# Patient Record
Sex: FEMALE | Race: WHITE | NOT HISPANIC OR LATINO | Employment: FULL TIME | URBAN - METROPOLITAN AREA
[De-identification: names, ages, dates, MRNs, and addresses within clinical notes are randomized per-mention and may not be internally consistent; named-entity substitution may affect disease eponyms.]

---

## 2017-01-26 ENCOUNTER — AMBULATORY - HEALTHEAST (OUTPATIENT)
Dept: OBGYN | Facility: CLINIC | Age: 37
End: 2017-01-26

## 2017-01-26 DIAGNOSIS — R87.610 ATYPICAL SQUAMOUS CELLS OF UNDETERMINED SIGNIFICANCE ON CYTOLOGIC SMEAR OF CERVIX (ASC-US): ICD-10-CM

## 2017-01-26 ASSESSMENT — MIFFLIN-ST. JEOR: SCORE: 1355.13

## 2017-03-20 ENCOUNTER — COMMUNICATION - HEALTHEAST (OUTPATIENT)
Dept: OBGYN | Facility: CLINIC | Age: 37
End: 2017-03-20

## 2017-03-20 ENCOUNTER — COMMUNICATION - HEALTHEAST (OUTPATIENT)
Dept: ADMINISTRATIVE | Facility: CLINIC | Age: 37
End: 2017-03-20

## 2017-03-30 ENCOUNTER — OFFICE VISIT - HEALTHEAST (OUTPATIENT)
Dept: OBGYN | Facility: CLINIC | Age: 37
End: 2017-03-30

## 2017-03-30 DIAGNOSIS — Z31.9 INFERTILITY MANAGEMENT: ICD-10-CM

## 2017-03-30 ASSESSMENT — MIFFLIN-ST. JEOR: SCORE: 1350.6

## 2017-04-06 ENCOUNTER — COMMUNICATION - HEALTHEAST (OUTPATIENT)
Dept: OBGYN | Facility: CLINIC | Age: 37
End: 2017-04-06

## 2017-04-11 ENCOUNTER — AMBULATORY - HEALTHEAST (OUTPATIENT)
Dept: LAB | Facility: CLINIC | Age: 37
End: 2017-04-11

## 2017-04-11 DIAGNOSIS — Z31.9 INFERTILITY MANAGEMENT: ICD-10-CM

## 2017-04-14 ENCOUNTER — HOSPITAL ENCOUNTER (OUTPATIENT)
Dept: RADIOLOGY | Facility: HOSPITAL | Age: 37
Discharge: HOME | End: 2017-04-14
Attending: FAMILY MEDICINE

## 2017-04-14 DIAGNOSIS — Z31.9 INFERTILITY MANAGEMENT: ICD-10-CM

## 2017-04-20 ENCOUNTER — COMMUNICATION - HEALTHEAST (OUTPATIENT)
Dept: FAMILY MEDICINE | Facility: CLINIC | Age: 37
End: 2017-04-20

## 2017-04-20 ENCOUNTER — AMBULATORY - HEALTHEAST (OUTPATIENT)
Dept: FAMILY MEDICINE | Facility: CLINIC | Age: 37
End: 2017-04-20

## 2017-04-20 DIAGNOSIS — A60.04 TYPE 2 HSV INFECTION OF VULVOVAGINAL REGION: ICD-10-CM

## 2017-07-17 ENCOUNTER — PRENATAL OFFICE VISIT - HEALTHEAST (OUTPATIENT)
Dept: MIDWIFE SERVICES | Facility: CLINIC | Age: 37
End: 2017-07-17

## 2017-07-17 DIAGNOSIS — Z32.00 POSSIBLE PREGNANCY: ICD-10-CM

## 2017-07-17 DIAGNOSIS — A60.09 GENITAL HERPES AFFECTING PREGNANCY IN FIRST TRIMESTER: ICD-10-CM

## 2017-07-17 DIAGNOSIS — O09.521 SUPERVISION OF HIGH-RISK PREGNANCY OF ELDERLY MULTIGRAVIDA, FIRST TRIMESTER: ICD-10-CM

## 2017-07-17 DIAGNOSIS — O98.311 GENITAL HERPES AFFECTING PREGNANCY IN FIRST TRIMESTER: ICD-10-CM

## 2017-07-17 LAB — HIV 1+2 AB+HIV1 P24 AG SERPL QL IA: NEGATIVE

## 2017-07-17 RX ORDER — SWAB
1 SWAB, NON-MEDICATED MISCELLANEOUS DAILY
Status: SHIPPED | COMMUNITY
Start: 2017-07-17

## 2017-07-17 ASSESSMENT — MIFFLIN-ST. JEOR: SCORE: 1352.86

## 2017-07-18 ENCOUNTER — HOSPITAL ENCOUNTER (OUTPATIENT)
Dept: ULTRASOUND IMAGING | Facility: HOSPITAL | Age: 37
Discharge: HOME | End: 2017-07-18
Attending: ADVANCED PRACTICE MIDWIFE

## 2017-07-18 ENCOUNTER — AMBULATORY - HEALTHEAST (OUTPATIENT)
Dept: MIDWIFE SERVICES | Facility: CLINIC | Age: 37
End: 2017-07-18

## 2017-07-18 DIAGNOSIS — O09.521 SUPERVISION OF HIGH-RISK PREGNANCY OF ELDERLY MULTIGRAVIDA, FIRST TRIMESTER: ICD-10-CM

## 2017-07-18 LAB
HBV SURFACE AG SERPL QL IA: NEGATIVE
SYPHILIS RPR SCREEN - HISTORICAL: NORMAL

## 2017-07-19 ENCOUNTER — AMBULATORY - HEALTHEAST (OUTPATIENT)
Dept: OBGYN | Facility: CLINIC | Age: 37
End: 2017-07-19

## 2017-07-19 DIAGNOSIS — O99.891 ASYMPTOMATIC BACTERIURIA DURING PREGNANCY IN FIRST TRIMESTER: ICD-10-CM

## 2017-07-19 DIAGNOSIS — R82.71 ASYMPTOMATIC BACTERIURIA DURING PREGNANCY IN FIRST TRIMESTER: ICD-10-CM

## 2017-08-25 ENCOUNTER — PRENATAL OFFICE VISIT - HEALTHEAST (OUTPATIENT)
Dept: MIDWIFE SERVICES | Facility: CLINIC | Age: 37
End: 2017-08-25

## 2017-08-25 DIAGNOSIS — O09.522 SUPERVISION OF HIGH-RISK PREGNANCY OF ELDERLY MULTIGRAVIDA, SECOND TRIMESTER: ICD-10-CM

## 2017-08-25 ASSESSMENT — MIFFLIN-ST. JEOR: SCORE: 1375.54

## 2017-09-25 ENCOUNTER — HOSPITAL ENCOUNTER (OUTPATIENT)
Dept: ULTRASOUND IMAGING | Facility: HOSPITAL | Age: 37
Discharge: HOME | End: 2017-09-25
Attending: ADVANCED PRACTICE MIDWIFE

## 2017-09-25 DIAGNOSIS — O09.522 SUPERVISION OF HIGH-RISK PREGNANCY OF ELDERLY MULTIGRAVIDA, SECOND TRIMESTER: ICD-10-CM

## 2017-09-26 ENCOUNTER — AMBULATORY - HEALTHEAST (OUTPATIENT)
Dept: MIDWIFE SERVICES | Facility: CLINIC | Age: 37
End: 2017-09-26

## 2017-09-26 DIAGNOSIS — O99.891 ASYMPTOMATIC BACTERIURIA DURING PREGNANCY IN FIRST TRIMESTER: ICD-10-CM

## 2017-09-26 DIAGNOSIS — O98.311 GENITAL HERPES AFFECTING PREGNANCY IN FIRST TRIMESTER: ICD-10-CM

## 2017-09-26 DIAGNOSIS — O09.522 SUPERVISION OF HIGH-RISK PREGNANCY OF ELDERLY MULTIGRAVIDA, SECOND TRIMESTER: ICD-10-CM

## 2017-09-26 DIAGNOSIS — R82.71 ASYMPTOMATIC BACTERIURIA DURING PREGNANCY IN FIRST TRIMESTER: ICD-10-CM

## 2017-09-26 DIAGNOSIS — A60.09 GENITAL HERPES AFFECTING PREGNANCY IN FIRST TRIMESTER: ICD-10-CM

## 2017-10-05 ENCOUNTER — PRENATAL OFFICE VISIT - HEALTHEAST (OUTPATIENT)
Dept: MIDWIFE SERVICES | Facility: CLINIC | Age: 37
End: 2017-10-05

## 2017-10-05 DIAGNOSIS — O99.891 ASYMPTOMATIC BACTERIURIA DURING PREGNANCY IN FIRST TRIMESTER: ICD-10-CM

## 2017-10-05 DIAGNOSIS — R82.71 ASYMPTOMATIC BACTERIURIA DURING PREGNANCY IN FIRST TRIMESTER: ICD-10-CM

## 2017-10-05 DIAGNOSIS — O09.529 SUPERVISION OF HIGH-RISK PREGNANCY OF ELDERLY MULTIGRAVIDA: ICD-10-CM

## 2017-10-05 ASSESSMENT — MIFFLIN-ST. JEOR: SCORE: 1424.99

## 2017-10-07 ENCOUNTER — COMMUNICATION - HEALTHEAST (OUTPATIENT)
Dept: OBGYN | Facility: CLINIC | Age: 37
End: 2017-10-07

## 2017-10-07 DIAGNOSIS — R82.71 ASYMPTOMATIC BACTERIURIA DURING PREGNANCY IN FIRST TRIMESTER: ICD-10-CM

## 2017-10-07 DIAGNOSIS — O99.891 ASYMPTOMATIC BACTERIURIA DURING PREGNANCY IN FIRST TRIMESTER: ICD-10-CM

## 2017-10-08 ENCOUNTER — AMBULATORY - HEALTHEAST (OUTPATIENT)
Dept: OBGYN | Facility: CLINIC | Age: 37
End: 2017-10-08

## 2017-10-08 DIAGNOSIS — R82.71 ASYMPTOMATIC BACTERIURIA DURING PREGNANCY IN SECOND TRIMESTER: ICD-10-CM

## 2017-10-08 DIAGNOSIS — O99.891 ASYMPTOMATIC BACTERIURIA DURING PREGNANCY IN SECOND TRIMESTER: ICD-10-CM

## 2017-11-02 ENCOUNTER — PRENATAL OFFICE VISIT - HEALTHEAST (OUTPATIENT)
Dept: MIDWIFE SERVICES | Facility: CLINIC | Age: 37
End: 2017-11-02

## 2017-11-02 DIAGNOSIS — O99.891 ASYMPTOMATIC BACTERIURIA DURING PREGNANCY IN FIRST TRIMESTER: ICD-10-CM

## 2017-11-02 DIAGNOSIS — O09.529 SUPERVISION OF HIGH-RISK PREGNANCY OF ELDERLY MULTIGRAVIDA: ICD-10-CM

## 2017-11-02 DIAGNOSIS — R82.71 ASYMPTOMATIC BACTERIURIA DURING PREGNANCY IN FIRST TRIMESTER: ICD-10-CM

## 2017-11-02 ASSESSMENT — MIFFLIN-ST. JEOR: SCORE: 1468.98

## 2017-11-04 ENCOUNTER — AMBULATORY - HEALTHEAST (OUTPATIENT)
Dept: OBGYN | Facility: CLINIC | Age: 37
End: 2017-11-04

## 2017-11-06 ENCOUNTER — AMBULATORY - HEALTHEAST (OUTPATIENT)
Dept: MIDWIFE SERVICES | Facility: CLINIC | Age: 37
End: 2017-11-06

## 2017-11-06 ENCOUNTER — COMMUNICATION - HEALTHEAST (OUTPATIENT)
Dept: ADMINISTRATIVE | Facility: CLINIC | Age: 37
End: 2017-11-06

## 2017-11-06 DIAGNOSIS — A60.09 GENITAL HERPES AFFECTING PREGNANCY IN FIRST TRIMESTER: ICD-10-CM

## 2017-11-06 DIAGNOSIS — N39.0 UTI (URINARY TRACT INFECTION): ICD-10-CM

## 2017-11-06 DIAGNOSIS — O09.529 SUPERVISION OF HIGH-RISK PREGNANCY OF ELDERLY MULTIGRAVIDA: ICD-10-CM

## 2017-11-06 DIAGNOSIS — O99.891 ASYMPTOMATIC BACTERIURIA DURING PREGNANCY IN FIRST TRIMESTER: ICD-10-CM

## 2017-11-06 DIAGNOSIS — R82.71 ASYMPTOMATIC BACTERIURIA DURING PREGNANCY IN FIRST TRIMESTER: ICD-10-CM

## 2017-11-06 DIAGNOSIS — O98.311 GENITAL HERPES AFFECTING PREGNANCY IN FIRST TRIMESTER: ICD-10-CM

## 2017-11-16 ENCOUNTER — PRENATAL OFFICE VISIT - HEALTHEAST (OUTPATIENT)
Dept: MIDWIFE SERVICES | Facility: CLINIC | Age: 37
End: 2017-11-16

## 2017-11-16 DIAGNOSIS — O99.891 ASYMPTOMATIC BACTERIURIA DURING PREGNANCY IN FIRST TRIMESTER: ICD-10-CM

## 2017-11-16 DIAGNOSIS — R82.71 ASYMPTOMATIC BACTERIURIA DURING PREGNANCY IN FIRST TRIMESTER: ICD-10-CM

## 2017-11-16 DIAGNOSIS — O09.529 SUPERVISION OF HIGH-RISK PREGNANCY OF ELDERLY MULTIGRAVIDA: ICD-10-CM

## 2017-11-16 ASSESSMENT — MIFFLIN-ST. JEOR: SCORE: 1464.9

## 2017-11-17 LAB — SYPHILIS RPR SCREEN - HISTORICAL: NORMAL

## 2017-11-29 ENCOUNTER — PRENATAL OFFICE VISIT - HEALTHEAST (OUTPATIENT)
Dept: MIDWIFE SERVICES | Facility: CLINIC | Age: 37
End: 2017-11-29

## 2017-11-29 DIAGNOSIS — O99.891 ASYMPTOMATIC BACTERIURIA DURING PREGNANCY IN FIRST TRIMESTER: ICD-10-CM

## 2017-11-29 DIAGNOSIS — R82.71 ASYMPTOMATIC BACTERIURIA DURING PREGNANCY: ICD-10-CM

## 2017-11-29 DIAGNOSIS — O09.529 SUPERVISION OF HIGH-RISK PREGNANCY OF ELDERLY MULTIGRAVIDA: ICD-10-CM

## 2017-11-29 DIAGNOSIS — R82.71 ASYMPTOMATIC BACTERIURIA DURING PREGNANCY IN FIRST TRIMESTER: ICD-10-CM

## 2017-11-29 DIAGNOSIS — O99.891 ASYMPTOMATIC BACTERIURIA DURING PREGNANCY: ICD-10-CM

## 2017-11-29 ASSESSMENT — MIFFLIN-ST. JEOR: SCORE: 1473.07

## 2017-11-30 ENCOUNTER — AMBULATORY - HEALTHEAST (OUTPATIENT)
Dept: OBGYN | Facility: CLINIC | Age: 37
End: 2017-11-30

## 2017-11-30 DIAGNOSIS — O23.43 UTI IN PREGNANCY, ANTEPARTUM, THIRD TRIMESTER: ICD-10-CM

## 2017-12-01 ENCOUNTER — COMMUNICATION - HEALTHEAST (OUTPATIENT)
Dept: OBGYN | Facility: CLINIC | Age: 37
End: 2017-12-01

## 2017-12-01 DIAGNOSIS — R82.71 ASYMPTOMATIC BACTERIURIA DURING PREGNANCY IN THIRD TRIMESTER: ICD-10-CM

## 2017-12-01 DIAGNOSIS — O99.891 ASYMPTOMATIC BACTERIURIA DURING PREGNANCY IN THIRD TRIMESTER: ICD-10-CM

## 2017-12-14 ENCOUNTER — PRENATAL OFFICE VISIT - HEALTHEAST (OUTPATIENT)
Dept: MIDWIFE SERVICES | Facility: CLINIC | Age: 37
End: 2017-12-14

## 2017-12-14 DIAGNOSIS — O09.529 SUPERVISION OF HIGH-RISK PREGNANCY OF ELDERLY MULTIGRAVIDA: ICD-10-CM

## 2017-12-14 DIAGNOSIS — O23.43: ICD-10-CM

## 2017-12-14 ASSESSMENT — MIFFLIN-ST. JEOR: SCORE: 1486.67

## 2017-12-15 ENCOUNTER — COMMUNICATION - HEALTHEAST (OUTPATIENT)
Dept: MIDWIFE SERVICES | Facility: CLINIC | Age: 37
End: 2017-12-15

## 2017-12-28 ENCOUNTER — PRENATAL OFFICE VISIT - HEALTHEAST (OUTPATIENT)
Dept: MIDWIFE SERVICES | Facility: CLINIC | Age: 37
End: 2017-12-28

## 2017-12-28 ENCOUNTER — AMBULATORY - HEALTHEAST (OUTPATIENT)
Dept: MIDWIFE SERVICES | Facility: CLINIC | Age: 37
End: 2017-12-28

## 2017-12-28 DIAGNOSIS — O09.529 SUPERVISION OF HIGH-RISK PREGNANCY OF ELDERLY MULTIGRAVIDA: ICD-10-CM

## 2017-12-28 DIAGNOSIS — A60.04 HERPES SIMPLEX VULVOVAGINITIS: ICD-10-CM

## 2017-12-28 ASSESSMENT — MIFFLIN-ST. JEOR: SCORE: 1495.29

## 2018-01-10 ENCOUNTER — COMMUNICATION - HEALTHEAST (OUTPATIENT)
Dept: ADMINISTRATIVE | Facility: CLINIC | Age: 38
End: 2018-01-10

## 2018-01-10 ENCOUNTER — HOSPITAL ENCOUNTER (OUTPATIENT)
Dept: ULTRASOUND IMAGING | Facility: HOSPITAL | Age: 38
Discharge: HOME | End: 2018-01-10
Attending: ADVANCED PRACTICE MIDWIFE

## 2018-01-10 ENCOUNTER — PRENATAL OFFICE VISIT - HEALTHEAST (OUTPATIENT)
Dept: MIDWIFE SERVICES | Facility: CLINIC | Age: 38
End: 2018-01-10

## 2018-01-10 DIAGNOSIS — O09.529 SUPERVISION OF HIGH-RISK PREGNANCY OF ELDERLY MULTIGRAVIDA: ICD-10-CM

## 2018-01-10 LAB — HGB BLD-MCNC: 12.1 G/DL (ref 12–16)

## 2018-01-10 ASSESSMENT — MIFFLIN-ST. JEOR: SCORE: 1518.43

## 2018-01-11 ENCOUNTER — AMBULATORY - HEALTHEAST (OUTPATIENT)
Dept: MIDWIFE SERVICES | Facility: CLINIC | Age: 38
End: 2018-01-11

## 2018-01-11 LAB
ALLERGIC TO PENICILLIN: YES
BACTERIA SPEC CULT: NO GROWTH
GP B STREP DNA SPEC QL NAA+PROBE: NEGATIVE

## 2018-01-17 ENCOUNTER — PRENATAL OFFICE VISIT - HEALTHEAST (OUTPATIENT)
Dept: MIDWIFE SERVICES | Facility: CLINIC | Age: 38
End: 2018-01-17

## 2018-01-17 DIAGNOSIS — O99.891 ASYMPTOMATIC BACTERIURIA DURING PREGNANCY IN FIRST TRIMESTER: ICD-10-CM

## 2018-01-17 DIAGNOSIS — R82.71 ASYMPTOMATIC BACTERIURIA DURING PREGNANCY IN FIRST TRIMESTER: ICD-10-CM

## 2018-01-17 DIAGNOSIS — A60.09 GENITAL HERPES AFFECTING PREGNANCY IN FIRST TRIMESTER: ICD-10-CM

## 2018-01-17 DIAGNOSIS — O98.311 GENITAL HERPES AFFECTING PREGNANCY IN FIRST TRIMESTER: ICD-10-CM

## 2018-01-17 DIAGNOSIS — O26.00: ICD-10-CM

## 2018-01-17 ASSESSMENT — MIFFLIN-ST. JEOR: SCORE: 1550.18

## 2018-01-24 ENCOUNTER — PRENATAL OFFICE VISIT - HEALTHEAST (OUTPATIENT)
Dept: MIDWIFE SERVICES | Facility: CLINIC | Age: 38
End: 2018-01-24

## 2018-01-24 DIAGNOSIS — O26.03 EXCESSIVE WEIGHT GAIN DURING PREGNANCY IN THIRD TRIMESTER: ICD-10-CM

## 2018-01-24 DIAGNOSIS — O09.529 SUPERVISION OF HIGH-RISK PREGNANCY OF ELDERLY MULTIGRAVIDA: ICD-10-CM

## 2018-01-24 ASSESSMENT — MIFFLIN-ST. JEOR: SCORE: 1545.64

## 2018-02-02 ENCOUNTER — PRENATAL OFFICE VISIT - HEALTHEAST (OUTPATIENT)
Dept: MIDWIFE SERVICES | Facility: CLINIC | Age: 38
End: 2018-02-02

## 2018-02-02 DIAGNOSIS — O09.529 SUPERVISION OF HIGH-RISK PREGNANCY OF ELDERLY MULTIGRAVIDA: ICD-10-CM

## 2018-02-02 ASSESSMENT — MIFFLIN-ST. JEOR: SCORE: 1558.34

## 2018-02-07 ENCOUNTER — COMMUNICATION - HEALTHEAST (OUTPATIENT)
Dept: OBGYN | Facility: CLINIC | Age: 38
End: 2018-02-07

## 2018-02-09 ENCOUNTER — HOME CARE/HOSPICE - HEALTHEAST (OUTPATIENT)
Dept: HOME HEALTH SERVICES | Facility: HOME HEALTH | Age: 38
End: 2018-02-09

## 2018-02-10 ENCOUNTER — HOME CARE/HOSPICE - HEALTHEAST (OUTPATIENT)
Dept: HOME HEALTH SERVICES | Facility: HOME HEALTH | Age: 38
End: 2018-02-10

## 2018-03-06 ENCOUNTER — COMMUNICATION - HEALTHEAST (OUTPATIENT)
Dept: MIDWIFE SERVICES | Facility: CLINIC | Age: 38
End: 2018-03-06

## 2018-03-21 ENCOUNTER — AMBULATORY - HEALTHEAST (OUTPATIENT)
Dept: MIDWIFE SERVICES | Facility: CLINIC | Age: 38
End: 2018-03-21

## 2018-03-21 ENCOUNTER — OFFICE VISIT - HEALTHEAST (OUTPATIENT)
Dept: MIDWIFE SERVICES | Facility: CLINIC | Age: 38
End: 2018-03-21

## 2018-03-21 DIAGNOSIS — Z01.812 PRE-PROCEDURE LAB EXAM: ICD-10-CM

## 2018-03-21 DIAGNOSIS — Z12.4 PAP SMEAR FOR CERVICAL CANCER SCREENING: ICD-10-CM

## 2018-03-21 ASSESSMENT — MIFFLIN-ST. JEOR: SCORE: 1382.35

## 2018-03-22 LAB
C TRACH DNA SPEC QL PROBE+SIG AMP: NEGATIVE
HPV SOURCE: NORMAL
HUMAN PAPILLOMA VIRUS 16 DNA: NEGATIVE
HUMAN PAPILLOMA VIRUS 18 DNA: NEGATIVE
HUMAN PAPILLOMA VIRUS FINAL DIAGNOSIS: NORMAL
HUMAN PAPILLOMA VIRUS OTHER HR: NEGATIVE
N GONORRHOEA DNA SPEC QL NAA+PROBE: NEGATIVE
SPECIMEN DESCRIPTION: NORMAL

## 2018-04-02 ENCOUNTER — OFFICE VISIT - HEALTHEAST (OUTPATIENT)
Dept: MIDWIFE SERVICES | Facility: CLINIC | Age: 38
End: 2018-04-02

## 2018-04-02 ENCOUNTER — AMBULATORY - HEALTHEAST (OUTPATIENT)
Dept: LAB | Facility: CLINIC | Age: 38
End: 2018-04-02

## 2018-04-02 DIAGNOSIS — Z97.5 PRESENCE OF OF 52 MG LEVONORGESTREL-RELEASING INTRAUTERINE DEVICE (IUD): ICD-10-CM

## 2018-04-02 DIAGNOSIS — Z30.430 ENCOUNTER FOR INSERTION OF MIRENA IUD: ICD-10-CM

## 2018-04-02 DIAGNOSIS — Z01.812 PRE-PROCEDURE LAB EXAM: ICD-10-CM

## 2018-04-02 LAB
HCG UR QL: NEGATIVE
SP GR UR STRIP: 1 (ref 1–1.03)

## 2018-04-02 ASSESSMENT — MIFFLIN-ST. JEOR: SCORE: 1382.35

## 2018-04-03 ENCOUNTER — AMBULATORY - HEALTHEAST (OUTPATIENT)
Dept: MIDWIFE SERVICES | Facility: CLINIC | Age: 38
End: 2018-04-03

## 2018-05-25 ENCOUNTER — OFFICE VISIT - HEALTHEAST (OUTPATIENT)
Dept: MIDWIFE SERVICES | Facility: CLINIC | Age: 38
End: 2018-05-25

## 2018-05-25 DIAGNOSIS — Z97.5 PRESENCE OF OF 52 MG LEVONORGESTREL-RELEASING INTRAUTERINE DEVICE (IUD): ICD-10-CM

## 2018-05-25 ASSESSMENT — MIFFLIN-ST. JEOR: SCORE: 1382.35

## 2018-05-29 ENCOUNTER — COMMUNICATION - HEALTHEAST (OUTPATIENT)
Dept: OBGYN | Facility: CLINIC | Age: 38
End: 2018-05-29

## 2018-05-29 ENCOUNTER — COMMUNICATION - HEALTHEAST (OUTPATIENT)
Dept: MIDWIFE SERVICES | Facility: CLINIC | Age: 38
End: 2018-05-29

## 2018-11-14 ENCOUNTER — RECORDS - HEALTHEAST (OUTPATIENT)
Dept: ADMINISTRATIVE | Facility: OTHER | Age: 38
End: 2018-11-14

## 2018-11-14 LAB
LAB AP CHARGES (HE HISTORICAL CONVERSION): NORMAL
PATH REPORT.COMMENTS IMP SPEC: NORMAL
PATH REPORT.FINAL DX SPEC: NORMAL
PATH REPORT.GROSS SPEC: NORMAL
PATH REPORT.MICROSCOPIC SPEC OTHER STN: NORMAL
PATH REPORT.RELEVANT HX SPEC: NORMAL
RESULT FLAG (HE HISTORICAL CONVERSION): NORMAL

## 2020-05-28 ENCOUNTER — COMMUNICATION - HEALTHEAST (OUTPATIENT)
Dept: SCHEDULING | Facility: CLINIC | Age: 40
End: 2020-05-28

## 2020-05-28 ENCOUNTER — OFFICE VISIT - HEALTHEAST (OUTPATIENT)
Dept: FAMILY MEDICINE | Facility: CLINIC | Age: 40
End: 2020-05-28

## 2020-05-28 DIAGNOSIS — J03.90 TONSILLITIS: ICD-10-CM

## 2020-05-28 ASSESSMENT — PATIENT HEALTH QUESTIONNAIRE - PHQ9: SUM OF ALL RESPONSES TO PHQ QUESTIONS 1-9: 0

## 2021-04-05 ENCOUNTER — RECORDS - HEALTHEAST (OUTPATIENT)
Dept: LAB | Facility: HOSPITAL | Age: 41
End: 2021-04-05

## 2021-04-07 LAB — VZV IGG SER QL IA: POSITIVE

## 2021-05-27 ASSESSMENT — PATIENT HEALTH QUESTIONNAIRE - PHQ9: SUM OF ALL RESPONSES TO PHQ QUESTIONS 1-9: 0

## 2021-05-29 ENCOUNTER — RECORDS - HEALTHEAST (OUTPATIENT)
Dept: ADMINISTRATIVE | Facility: CLINIC | Age: 41
End: 2021-05-29

## 2021-05-30 VITALS — BODY MASS INDEX: 21.37 KG/M2 | WEIGHT: 141 LBS | HEIGHT: 68 IN

## 2021-05-30 VITALS — HEIGHT: 68 IN | WEIGHT: 140 LBS | BODY MASS INDEX: 21.22 KG/M2

## 2021-05-31 VITALS — WEIGHT: 165.2 LBS | BODY MASS INDEX: 25.04 KG/M2 | HEIGHT: 68 IN

## 2021-05-31 VITALS — WEIGHT: 140.5 LBS | HEIGHT: 68 IN | BODY MASS INDEX: 21.29 KG/M2

## 2021-05-31 VITALS — HEIGHT: 68 IN | WEIGHT: 171.9 LBS | BODY MASS INDEX: 26.05 KG/M2

## 2021-05-31 VITALS — BODY MASS INDEX: 26.83 KG/M2 | HEIGHT: 68 IN | WEIGHT: 177 LBS

## 2021-05-31 VITALS — BODY MASS INDEX: 23.7 KG/M2 | HEIGHT: 68 IN | WEIGHT: 156.4 LBS

## 2021-05-31 VITALS — HEIGHT: 68 IN | WEIGHT: 167 LBS | BODY MASS INDEX: 25.31 KG/M2

## 2021-05-31 VITALS — BODY MASS INDEX: 25.76 KG/M2 | WEIGHT: 170 LBS | HEIGHT: 68 IN

## 2021-05-31 VITALS — BODY MASS INDEX: 25.17 KG/M2 | HEIGHT: 68 IN | WEIGHT: 166.1 LBS

## 2021-05-31 VITALS — WEIGHT: 183 LBS | BODY MASS INDEX: 27.74 KG/M2 | HEIGHT: 68 IN

## 2021-05-31 VITALS — WEIGHT: 145.5 LBS | HEIGHT: 68 IN | BODY MASS INDEX: 22.05 KG/M2

## 2021-05-31 VITALS — WEIGHT: 184 LBS | HEIGHT: 68 IN | BODY MASS INDEX: 27.89 KG/M2

## 2021-06-01 VITALS — WEIGHT: 185.8 LBS | BODY MASS INDEX: 28.16 KG/M2 | HEIGHT: 68 IN

## 2021-06-01 VITALS — WEIGHT: 147 LBS | HEIGHT: 68 IN | BODY MASS INDEX: 22.28 KG/M2

## 2021-06-01 VITALS — BODY MASS INDEX: 22.28 KG/M2 | WEIGHT: 147 LBS | HEIGHT: 68 IN

## 2021-06-08 NOTE — PROGRESS NOTES
"Cathleen Sterling is a 39 y.o. female who is being evaluated via a billable video visit.      The patient has been notified of following:     \"This video visit will be conducted via a call between you and your physician/provider. We have found that certain health care needs can be provided without the need for an in-person physical exam.  This service lets us provide the care you need with a video conversation.  If a prescription is necessary we can send it directly to your pharmacy.  If lab work is needed we can place an order for that and you can then stop by our lab to have the test done at a later time.    Video visits are billed at different rates depending on your insurance coverage. Please reach out to your insurance provider with any questions.    If during the course of the call the physician/provider feels a video visit is not appropriate, you will not be charged for this service.\"    Patient has given verbal consent to a Video visit? Yes    Patient would like to receive their AVS by AVS Preference: Jean.    Patient would like the video invitation sent by: Send to e-mail at: [email or web address]    Will anyone else be joining your video visit? No        Video Start Time: 9:35AM    Additional provider notes: Patient is a 39-year-old mother of 3 at home for 2 or 3 days with an increasing sore throat past medical history of streptococcal pharyngitis.  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  Patient will be off of work 1 day from now for 7 days.  Clinically she is a little achy denies a fever no cough constitutionally she feels that she may be coming down with strep.  We did conduct a video visit appears not to have any swollen lymph nodes or clinical cough but redness in the posterior pharynx.  I have made a medical decision to treat her.  She has a documented penicillin allergy so we will treat her with a 10-day course of erythromycin.  Pros " and cons of therapy discussed with patient she will contact us if she feels she is clinically declining.  If she needs a work slip and will also fill that out.  All medical questions asked were answered.  Pleasant patient      Video-Visit Details    Type of service:  Video Visit    Video End Time (time video stopped): 9:48  Originating Location (pt. Location): home    Distant Location (provider location):  Home office    Platform used for Video Visi doximity

## 2021-06-08 NOTE — TELEPHONE ENCOUNTER
"\"I have a really bad sore throat.\" Symptoms starting 2 days ago. Rating pain as \"moderate pain.\"  Patient is alternating tylenol and Ibuprofen. Reporting possible white patches on throat. Swollen glands. Afebrile. Taking fluids.     Warm transferred to Central Scheduling.     Luz Nguyen RN  [facility]    COVID 19 Nurse Triage Plan/Patient Instructions    Please be aware that novel coronavirus (COVID-19) may be circulating in the community. If you develop symptoms such as fever, cough, or SOB or if you have concerns about the presence of another infection including coronavirus (COVID-19), please contact your health care provider or visit [email or web address].     Disposition/Instructions    Patient to have scheduled Telephone Visit with a provider. Follow System Ambulatory Workflow for COVID 19.     The clinic staff will assist you to schedule an appointment to complete the Telephone Visit with a provider during normal clinic hours.       Call Back If: Your symptoms worsen before you are able to complete your Telephone Visit with a provider.        Thank you for limiting contact with others, wearing a simple mask to cover your cough, practice good hand hygiene habits and accessing our virtual services where possible to limit the spread of this virus.    For more information about COVID19 and options for caring for yourself at home, please visit the CDC website at https://www.cdc.gov/coronavirus/2019-ncov/about/steps-when-sick.html  For more options for care at [facility], please visit our website at [email or web address]    For more information, please use the [place] Department of Health COVID-19 Website: https://www.health.state.[place]/diseases/coronavirus/index.html  [place] Department of Health ([facility]) COVID-19 Hotlines (Interpreters available):      Health questions: Phone Number: [phone] or [phone] and Hours: 7 a.m. to 7 p.m.    Schools and child " care questions: Phone Number: [phone] or [phone] and Hours 7 a.m. to 7 p.m.                      Reason for Disposition    Pus on tonsils (back of throat) and swollen neck lymph nodes ('glands')    Protocols used: SORE THROAT-A-OH

## 2021-06-08 NOTE — PROGRESS NOTES
Colposcopy Procedure Note    Cathleen Sterling is a 36 y.o. female is here today for a Colposcopy. H/o of abnormal pap smears and now ASCUS HR HPV+    Indications:   Pap Smear history: 2012-LSIL, colp KYMBERLY I  2013, normal pap , but +type 31 HPV  2014, ASCUS, colp KYMBERLY I  8/14, and 11/15 normal pap, +HR HPV  12/16:Pap ASCUS HR HPV+,     Procedure Details   The reason for procedure is explained and informed consent obtained.    Speculum placed in vagina and visualization of cervix achieved, cervix swabbed with 3% acetic acid solution. Procedure details: No biopsies taken    Findings:  Cervix: no visible lesions and SCJ visualized 360 degrees without lesions;       Specimens: none    Complications: none.    Plan:   Roll of HPV in causing cervical pap smear abnomalities, dysplasia, and cancer is reviewed with the patient. She will be contacted in one year for repeat pap smear and HPV testing.    Irene Portillo

## 2021-06-09 NOTE — PROGRESS NOTES
"Assessment / Impression     1. Infertility management  Progesterone    Thyroid Stimulating Hormone (TSH)    Prolactin    XR Hysterosalpingogram       Plan:     1. Discussed workup of infertility. HSG ordered. Progesterone 1 week after ovulation ordered along with TSH and Prolactin. Significant other will get CPE and semen analysis. Then on day 3 she will have FSH and Estradiol ordered. Continue PNV. All questions answered.    Subjective:      HPI: Cathleen Sterling is a 36 y.o. female with  Infertility questions presents today. See 10/16 note for details. GYN update she had Pos home pregnancy tests in Nov (multiple), but when came in for confirmation the tests were negative. She had likely early miscarriage. She had normal Dec, Jan, and Feb cycles all 24-25 days with documented ovulation with home kits. No other change in personal history. Interested in talking about next steps to conceive.       Review of Systems  Pertinent items are noted in HPI.       Objective:     /63 (Patient Site: Left Arm, Patient Position: Sitting, Cuff Size: Adult Regular)  Pulse 64  Ht 5' 7.5\" (1.715 m)  Wt 140 lb (63.5 kg)  LMP 03/13/2017  SpO2 100%  BMI 21.6 kg/m2  The rest of the entire 20 minute visit greater than 50% of our visit was spent face to face counseling infertility workup and management  "

## 2021-06-11 NOTE — PROGRESS NOTES
PRENATAL VISIT   FIRST OBSTETRICAL EXAM - OB    Assessment / Impression     First prenatal visit at 10.4 weeks    AMA >36 yo  Hx HSV    Plan:     -IOB labs drawn.  -Pt is interested in drawing lead level.  -Patient is likely not interested in waterbirth.  Hep C not drawn today.  -Reviewed prenatal care schedule.  -Optimal nutrition and weight gain discussed. Pregnancy weight gain of 25-35 lbs (BMI 18.5-24.9) encouraged.   -Anticipatory guidance for common pregnancy questions and concerns reviewed.   -Danger s/sx for this trimester reviewed with patient.  -Reviewed genetic carrier screening specific to patients ethnic heritage.  Patient declines the screening  -Reviewed genetic aneuploidy screening options. Patient will discuss with her  and consider aneuploidy screening. Information given on Verifi with [facility], and first trimester screening - knows to call back if desires either and timeframe of first trimester screening.  -The following referrals were given to patient for follow up: OB U/S for viability/dating.  -IOB packet given and reviewed with patient.  -CNM services and hospital options reviewed; emergency and scheduling phone numbers given to patient.  -Return to clinic 4 weeks    Total time spent with patient: 45 minutes, >50% time spent counseling and coordinating care.    Subjective:    Cathleen Sterling is a 37 y.o.  here today for her First Obstetrical Exam.  She had been trying to get pregnant for about 1.5 years - did not have formal infertility work-up.    This is her and Dago's first baby together.  Dago has a 6 year old child from a previous relationship.  Her last pap smear was 2016 and ASCUS with +HPV, a colposcopy was done and recommendation to repeat pap in one year - will do postpartu.    Her older child, Pepe, was a fairly quick labor as she arrived to the hospital at 8cm, received Nubain, and recalls not pushing for very long.  She had no tears or stitches.       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  Partners name: Dago      OB History    Para Term  AB Living   4 1 1  2 1   SAB TAB Ectopic Multiple Live Births   1 1   1      # Outcome Date GA Lbr Huseyin/2nd Weight Sex Delivery Anes PTL Lv   4 Current            3 SAB 2016 5w0d             Birth Comments: No D&C   2 TAB 2008              Birth Comments: D&C   1 Term 05 38w0d  7 lb 11 oz (3.487 kg) M Vag-Spont IV N KAELYN      Birth Comments: 8 cm on arrival (back labor), GBS positive, no tear - no stitches, BF x1 year          Expected Date of Delivery: 2018    Past Medical History:   Diagnosis Date     Abnormal Pap smear of cervix     see labs (ASCUS with HPV+,LSIL)     Allergic     seasonal, used to get allergy shots     Herpes 2013    rare outbreak     Varicella     as a child     Past Surgical History:   Procedure Laterality Date     COLPOSCOPY  2017     WISDOM TOOTH EXTRACTION       Social History   Substance Use Topics     Smoking status: Never Smoker     Smokeless tobacco: Never Used     Alcohol use No      Comment: Social     Current Outpatient Prescriptions   Medication Sig Dispense Refill     betamethasone dipropionate (DIPROLENE) 0.05 % ointment Apply 1-2 times daily x 2-4 weeks. After achieving remission then apply 2-3 times per week to prevent relapse for 2-3 months. 30 g 0     prenatal vitamin iron-folic acid 27mg-0.8mg (PRENATAL S) 27 mg iron- 800 mcg Tab tablet Take 1 tablet by mouth daily.       No current facility-administered medications for this visit.      Allergies   Allergen Reactions     Penicillins Unknown     When patient was younger.               Pregnancy Risk Factors: Age is <18 or >35    EPDS score today: 3    Review of Systems  General:  Denies problem  Eyes: Denies problem  Ears/Nose/Throat: Denies problem  Cardiovascular: Denies problem  Respiratory:  Denies problem  Gastrointestinal:  Denies problem  Genitourinary: Denies  "problem  Musculoskeletal:  Denies problem  Skin: Denies problem  Neurologic: Denies problem  Psychiatric: Denies problem  Endocrine: Denies problem  Heme/Lymphatic: Denies problem   Allergic/Immunologic: Denies problem       Objective:   Objective    Vitals:    07/17/17 0827   BP: 102/62   Pulse: 76   Resp: 18   Weight: 140 lb 8 oz (63.7 kg)   Height: 5' 7.5\" (1.715 m)     Physical Exam:  General Appearance: Alert, cooperative, no distress, appears stated age  Head: Normocephalic, without obvious abnormality, atraumatic  Eyes: Conjunctiva/corneas clear  Neck: Supple, symmetrical, trachea midline, no adenopathy  Thyroid: not enlarged, symmetric, no tenderness/mass/nodules  Back: Symmetric, no curvature, ROM normal, no CVA tenderness  Lungs: Clear to auscultation bilaterally, respirations unlabored  Heart: Regular rate and rhythm, S1 and S2 normal, no murmur, rub, or gallop  Breasts: No breast masses, tenderness, asymmetry, or nipple discharge. Nipples are everted.  Abdomen: Soft, non-tender, bowel sounds active all four quadrants, no masses.   FHT: NH   Vulva:  no sign of lesions or condyloma, normal hair distribution  Vagina: pink, normal rugae, no abnormal discharge  Cervix:  long/thick/closed, no lesions or inflammation noted, negative CMT with exam  Uterus: Mid position, non tender, enlarged approximately 12 weeks size  Adnexa:  no masses appreciated, non-tender with palpation  Pelvic spines:AVERAGE  Sacrum:STRAIGHT  Suprapubic Arch:NORMAL  Pelvis type:gynecoid            Extremities: Extremities normal, atraumatic, no cyanosis or edema  Skin: Skin color, texture, turgor normal, no rashes or lesions  Lymph nodes: Cervical, supraclavicular, and axillary nodes normal  Neurologic: Alert and oriented x 3.    Lab:   Results for orders placed or performed in visit on 07/17/17   Pregnancy, Urine   Result Value Ref Range    Pregnancy Test, Urine Positive (!) Negative          "

## 2021-06-12 NOTE — PROGRESS NOTES
Cathleen Sterling is a 37 y.o. female  at 16w1d doing well. Here alone today. Denies any warning s/sx. GFM. Notes questions about working out; states she is doing some aerobic type classes and doing some light weight lifting. Encouraged her to continue if it is comfortable to her and adjust as needed. Plan is to RTC 4 weeks. Will send for 20 week US. Does not want to do any genetic screening or any level II US.

## 2021-06-13 NOTE — PROGRESS NOTES
TC: spoke with Cathleen who reports she is asymptomatic for her current UTI that persisted after 7 day course of Macrobid. Cathleen states she is uncertain about her penicillin allergy and will need to discuss what her symptoms were with her mother. She states she is working a 12h shift today and tomorrow, her  is out of town and she does not anticipate being able to  the medication until Monday. Decision made to have her speak with her mother at her earliest convenience and call [facility] clinic with this allergy information prior to ordering an antibiotic for this persisting UTI. States she is aware of s/sx of progressing UTI and understands to call sooner with complications. Offers no further questions.

## 2021-06-13 NOTE — PROGRESS NOTES
Cathleen is here for SHELBY visit at 26 weeks. Feeling well. Appetite good, denies nausea/heartburn. Denies signs and symptoms of UTI today. Finished Bactrim. Test of cure today.  Discussed weight gain, and good nutrition and diet choices. 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 Reported some ankle swelling  - +1 brief pitting edema @ ankles. Discussed compression stocking which she has. Encouraged adequate hydration, and elevation of feet. Discussed signs and symptoms of PreE.  Plan to return to clinic in 2 weeks for 28 week labs, glucose screen, and Rhophylac.    Karen Zamora, RN, SNM  Gaye Goode, APRN, CNM present entire visit and agree with assessment.

## 2021-06-13 NOTE — PROGRESS NOTES
Cathleen Sterling is here for a SHELBY visit now 22 weeks. 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 She does not have to care for TB patients. We reviewed her fetal survey US and plans gender to be a surprise. Information given on 1 hr GCT test to be done between 26 - 28 wks, doulas and vaccines. She had influenza vaccine at work. Denies PTL symptoms. States she may need Teds at work as she has some lower extremity edema but no edema this morning. She will do a clean catch UC for recent E. Coli UTI (asymptomatic RADHA).

## 2021-06-14 NOTE — PROGRESS NOTES
Cathleen is here alone for SHELBY visit @ 32 weeks. She completed her last antibiotic tablet on 12/11 for her 4th documented UTI current pregnancy. She is asymptomatic and did leave a RADHA UC today. Advised we will refer to urology if culture is positive. H/O only 1 prior UTI not in pregnancy and no H/O pyelo. She continues working .8 doing both 8 and 12 hour shifts. No definite work stop date. She plans a 3 month ZAHIDA. She is wearing knee high support hose which is helping her lower extremity edema which was only a trace today. Denies PTL symptoms.

## 2021-06-14 NOTE — PROGRESS NOTES
Cathleen is here for her 28 week SHELBY visit. She reports she is still taking her Keflex, and has two days left. Plan to do RADHA in 2 weeks. Today, 28 week labs, including CMP. Rhophylac and TDAP given. Discussed s/s of PTL, as well as PreE. Denies leaking/bleeding, UCs, and reports baby is active. She is feeling well, and does not have any symptoms of a UTI. Discussed suppressive therapy when RADHA negative. Cathleen agrees to plan. Cathleen remains active, exercising a few days each week, and is eating well. Occasional heartburn, but is managing this with Tums. Using compression stockings for ankle swelling. Plans to RTC in 2 weeks. HGB = 11.7  Karen Zamora RN, SNM  FRANNY Maldonado,CNM, present for visit and agree with assessment.

## 2021-06-14 NOTE — PROGRESS NOTES
Cathleen Sterling is here by herself for a routine prenatal visit at 29w6d.  She has no concerns and is feeling well.  No symptoms of UTI.  Agrees with UC today and understands the plan for suppressive therapy when RADHA is negative.  She is feeling fetal movement regularly. Denies vaginal bleeding/loss of fluid.  Fetal maturity and expectations for fetal movement in the third trimester, how and when to do fetal kick counts and when to call CNM discussed. Appetite is normal. Interval weight gain is appropriate.  Weight gain chart reviewed with patient. No plans for CBE. Reviewed and provided patient with handouts. BC discussion: She's used the pill and depo in the past; hard for her to remember to take a daily pill.  Liked depo.  Discussed this as an option.   Anticipatory guidance, warning signs (with emphasis on  labor signs and symptoms), when to call and CNM contact information reviewed. RTC in 2 weeks.

## 2021-06-14 NOTE — PROGRESS NOTES
Cathleen Sterling is a  who has had a persistent UTI in pregnancy and had a positive UTI yesterday at her SHELBY visit. She has tried a Macrobid and originally had it prescribed for 5 days but had that dose extended to 7 days. She had a RADHA which showed that it hadn't been treated yet and then was started on Keflex for 7 days. She then had another RADHA yesterday and it showed E.Coli >100,000 and so Rx for Trimethoprim-sulfamethoxazole 800-160 mg BID x3 days was prescribed. Discussed with Cathleen that this is the next best and reasonable step given her resistance to previous treatments and her noted allergy to penicillin derived antibiotics. Discussed that we will try another RADHA at her next SHELBY visit and if that still shows bacteruria, then we should consider a referral to urology to see what might be underlying this difficult to treat UTI in pregnancy. Discussed we likely will also consider suppressive therapy once we are able to cure this UTI. She denies any other questions or concerns at this time.    FRANNY Reaves, MAXI   2017 2:17 PM

## 2021-06-15 NOTE — PROGRESS NOTES
Cathleen is here alone for her 37 weeks 6 day return OB visit.  States she is feeling quite well and she continues working 0.8.  She has 3 twelve hour call shifts this weekend which is her last long work stretch.  She wears [employer]st maternity leotard while working. She feels her lower extremity edema is +2 which is unchanged. She denies headaches, visual changes, or RUQ pain. Offered HELLP but she is normotensive and has no other symptoms of pre-eclampsia.   Discussed contraception and she is  most interested in a LARC method. I gave her a brochure on Nexplanon and we discussed Mirena. Reports normal fetal movement. She has [facility] contact information.  She is ready for the baby at home. Continues on Valtrex and denies any symptoms of HSV. Reviewed negative GBS and normal hemoglobin 12.1. Asymptomatic for UTI.

## 2021-06-15 NOTE — PROGRESS NOTES
Cathleen Sterling is here by herself for a routine prenatal visit at 35w6d.  She is overall well.  Continues with BLE edema; currently wearing knee high compression socks.  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 Discussed PO fluids, decreasing sodium in her diet, and elevating legs as often as she can.  She shares that she loves soups and eats it often.   Denies HA, visual changes, epigastric pain.  Rx provided for [employer]st full length stockings x 2.  Reviewed options for breast pump.  Desires Spectra, provided to patient today.    Vertex likely by [employer] but cannot confirm with VE.  US recommended and accepted.  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  Order placed, patient advised to order asap.    UC ordered for today.  Not obtained at last visit.  She shares that suppressive therapy was never initiated.  Fetal movement is normal.  Denies vaginal bleeding/loss of fluid.   Interval weight gain is approriate. Reviewed weight gain chart with patient.  Group B strep and hemoglobin discussed and obtained today.  Reviewed and provided patient with handouts.   Anticipatory guidance, signs of symptoms of labor or SROM, third trimester warning signs, when to call and CNM contact information reviewed. RTC in 1 week, and weekly thereafter.    NV: 36 week checklist

## 2021-06-15 NOTE — PROGRESS NOTES
Cathleen is here for her 34 week return OB visit.  She is feeling well and continues working 0.8.  She wears support hose during work.  Today she had no foot edema but trace to +1 lower leg edema.  She had a GI flu 2 days ago where she had nausea and vomiting for 12 hours.  She has now returned to normal eating and drinking. We reviewed her OB details. We discussed contraception and she has some interest in Nexplanon. We also discussed labor analgesia. She had Nubain with her first pregnancy, arriving at 8 cm and delivering expeditiously. She would be open to trying NO. Plan GBS/Hgb at NV. I will order HSV prophylaxis to start about 36 weeks. Last outbreak prior to this pregnancy.  She was delighted that her last UC was negative - she is asymptomatic.

## 2021-06-15 NOTE — PROGRESS NOTES
Cathleen is here alone for her 39 week 1 day return OB visit. States she is feeling well, denies any signs or symptoms of labor. Patient continues to take Valtrex and denies any symptoms of HSV. Patient reports normal fetal movements, went over normal fetal movement and when to call on call CNM for decreased fetal movement. Went over labor precautions and went to call CNM. Went over patient's GBS- status. Patient has a spectra breast pump at home, instructed patient to bring pump with her to the hospital to get help from lactation with using it. Also encourage patient to go on [email or web address] to get more breastfeeding & pumping education. Leopold's maneuver, FHT, & fundal height all within normal limits. Patient plans on making 40 week appointment today, discussed at this visit we would discuss plans for induction if patient goes overdue. Briefly reviewed post-dates protocol and will review in greater detail at NV. Will need RADHA urine culture at visit on 2/9/18 if still pregnant for recurrent UTIs in pregnancy. RTC 1 week. I saw this patient with JOSE Goode RN, LAURAP, was present for the exam and counseling and am in agreement with the above A&P. FRANNY Reaves, MAXI   2/2/2018 9:46 AM

## 2021-06-16 PROBLEM — Z97.5 PRESENCE OF 52 MG LEVONORGESTREL-RELEASING INTRAUTERINE DEVICE (IUD): Status: ACTIVE | Noted: 2018-04-02

## 2021-06-16 NOTE — PROGRESS NOTES
JILLIAN@Patient ID: Cathleen Sterling is a 37 y.o. female.  Assessment:   Normal postpartum exam at ~6 weeks postpartum.   lactating    Plan:    1. Pap smear done at today's visit. Due for annual Gyn exam in March 2019   2. Desired contraception: IUD. Mirena - Discussed risks: Infection, bleeding, uterine perforation.  Benefits: Greater than 99% effective for contraception, decrease menstrual bleeding.  I also discussed normal side effects such as spotting/continual bleeding for the first 4-6 months after insertion.  Discussed possibility of amenorrhea.  3. Discussed resumption of exercise and setting a goal to return to pre-pregnancy weight in the next 6-12 months.   4.  Resumption of intercourse reviewed with possible changes in libido and vaginal lubrication while nursing.  5.  Nutrition and supplements reviewed.  Advised continuation of a prenatal or multivitamin, also Vitamin D3 5000 IU geltab daily and an omega 3 fatty acid supplement.  6. Adjustment to parenting, self care and open communication with her support system discussed. Warning signs and symptoms related to postpartum mood disorders reviewed.     Total time spent with patient 40 minutes, >50% counseling, education and coordination of care.    Subjective:     Cathleen Sterling is a 37 y.o. female who presents for postpartum visit. She is 6 weeks postpartum following a NSVB.  I have fully reviewed the prenatal and intrapartum course. The delivery was at Term and 39.6 weeks gestation Her baby girl is named Alycia and weighed 7 lbs 5 oz at birth.     Postpartum course has been stable. Baby's course has been stable. Baby is feeding breast.  Lochia ceased at 5 weeks postpartum.  Bowel function is normal. Bladder function is normal. She has resumed intercourse. Desired contraception: IUD - Mirena. [place] postpartum depression screening score: 0. She has not resumed regular exercise. Patient returns to work in 10 weeks.    Review of  Systems  General:  Denies problem  Eyes: Denies problem  Ears/Nose/Throat: Denies problem  Cardiovascular: Denies problem  Respiratory:  Denies problem  Gastrointestinal:  Denies problem,   Genitourinary: Denies problem  Musculoskeletal:  Denies problem  Skin: Denies problem  Neurologic: Denies problem  Psychiatric: Denies Problem  Endocrine: Denies problem    Objective:         Physical Exam:  General Appearance: Alert, cooperative, no distress, appears stated age  Skin: Skin color, texture, turgor normal, no rashes or lesions  Throat: Lips, mucosa, and tongue normal; teeth and gums normal  HEENT: grossly normal; otoscopic and opthalmic exam not performed.   Neck: Supple, symmetrical, trachea midline, no adenopathy;  thyroid: not enlarged, symmetric, no tenderness/mass/nodules  Lungs: Clear to auscultation bilaterally, respirations unlabored  Breasts: No breast masses, tenderness, asymmetry, or nipple discharge.  Heart: Regular rate and rhythm, S1 and S2 normal, no murmur, rub, or gallop  Abdomen: Soft, non-tender.   Pelvic:External genitalia normal without lesions or irritation. Vagina and cervix show no lesions, inflammation, discharge or tenderness.   No cystocele, No rectocele. Uterus fully involuted.  No adnexal mass or tenderness.  Extremities: Extremities normal without varicosities or edema       Last Pap: 12/8/16. Results were: abnormal +HPV with ASCUS, colpo with Dr. Pennington on 1/26/17, pap due today  Immunization History   Administered Date(s) Administered     DT (pediatric) 08/26/2003     Hep A, historic 06/08/2007, 09/04/2008     Influenza, inj, historic,unspecified 10/01/2008, 10/28/2013, 10/01/2016, 10/02/2017     Td,adult,historic,unspecified 06/08/2007     Tdap 06/08/2007, 11/16/2017     Immunization status: up to date and documented

## 2021-06-17 NOTE — PROGRESS NOTES
IUD Insertion Procedure Note    Pre-operative Diagnosis: Desire for LARC    Post-operative Diagnosis: Mirena IUD in place    Indications: contraception    Procedure Details   Cathleen was given an opportunity to ask questions about all forms of birth control, meaning all prescriptions, non-prescription, and natural methods.  Consult also done at Postpartum visit.  All of her questions were answered to her satisfaction and she understood all of those answers.  She understands that no method of birth control, except abstinence, is 100% effective against pregnancy or christine sexually transmitted diseases, including Human Immunodeficiency Virus (HIV) infection that leads to the Acquired Immunodeficiency Syndrome (AIDS) disease. The following benefits, risk/side effects, warning signs, control method, intrauterine decisions to discontinue use option, regarding the birth control method, intrauterine device, were explained to her before she voluntarily decided to use this method of birth control.  BENEFITS: The IUD is 97-99% effective if all the directions regarding its use are followed carfeully. It can be more effective if used with foam or condoms mid-cycle between periods. IUDs containing progestin may decrease menstrual flow and painful menstrual periods. The IUD provides longer protection from pregnancy (Paragard- 10 years; Mirena - 5 years)  RISKS/ SIDE EFFECTS  1. Spotting, bleeding, hemorrhage or anemia  2. Cramping or pain  3. Partial or complete expulsion of device leading to pregnancy, the pregnancy ending in miscarriage  4. Lost IUD string or other string problems  5. Puncturing of the uterus, embedding, or cervical perforation  6. Increased risk of pelvic inflammatory disease  WARNING SIGNS: The patient was advised  to call the clinic if she has any of the following early warning signs:  P - Period late (pregnancy), abnormal spotting or bleeding  A - Abdominal pain, pain with intercourse  I - Infection  exposure (such as gonorrhea), abnormal discharge  N - Not feeling well, fever, chills  S - String missing, shorter or longer  ALTERNATIVES: She received written information about other methods of birth control and she chose the IUD.  She understands that she should check for the IUD strings several times during the first few months after insertion and then after each monthly period or before intercourse.   DECISION TO DISCONTINUE USE: She understands that she may have the IUD removed at any time. She knows not try to remove the device and that it should be removed only by a medical provider. If she does not desire to become pregnant, she has been told she may request to have another IUD inserted or choose to use another method of birth control. If she wishes to become pregnant, she understands most women not using birth control become pregnant within 12 months.UD.  Urine pregnancy test was done and result was negative.  The risks (including infection, bleeding, pain, and uterine perforation) and benefits of the procedure were explained to the patient and Written informed consent was obtained.      Cervix cleansed with Betadine. Tenaculum placed at 11 o'clock and 1 o'clock.  Uterus sounded to 6 cm. IUD inserted without difficulty. String visible and trimmed to 3-4cm. Patient tolerated procedure well.    IUD Information:  Mirena, Lot # [id number], Expiration date 4/20.    Condition:  Stable    Complications:  None    Plan:    The patient was advised to call for any fever or for prolonged or severe pain or bleeding. She was advised to use NSAID as needed for mild to moderate pain.     Attending Physician Documentation:  I was present for or participated in the entire procedure, including opening and closing.

## 2021-06-18 NOTE — PROGRESS NOTES
"S: Cathleen presents for a placement check of her IUD.  She reports having not yet felt the strings.  Discussed checking the strings monthly.  She has not experience bleeding/spotting since placement of the IUD.     O:  BP 98/60 (Patient Site: Left Arm, Patient Position: Sitting, Cuff Size: Adult Regular)  Pulse 68  Ht 5' 7.5\" (1.715 m)  Wt 147 lb (66.7 kg)  Breastfeeding? Yes  BMI 22.68 kg/m2    Patient appears well, in no apparent distress.       Pelvic exam: Vulva without lesions, erythema, or open areas.  Vaginal mucosa pink and rugated.  Cervix pink and smooth without polyps.  Able to visualize IUD strings that appear to be 2.5 cm in length. No abnormal vaginal discharge or foul odor present. No pelvic masses or CMT on bimanual exam.      A: IUD in proper place        P:   -Check IUD strings at a designated time each month.  If unable to palpate strings patient to call for a CNM appointment   -Follow up prn or in approximately 1 year for annual well woman exam     Total time spent with patient 20 minutes.  >50% of the time spent counseling and coordinating care.     Christiano BLANTON CNM  "

## 2021-06-25 NOTE — PROGRESS NOTES
"Progress Notes by Niyah Judd APRN, CNM at 10/8/2017  1:40 PM     Author: Niyah Judd APRN, CNM Service: -- Author Type: Midwife    Filed: 10/8/2017  1:45 PM Encounter Date: 10/8/2017 Status: Signed    : Niyah Judd APRN, CNM (Midwife)       Left patient the following message via voicemail and results release in My Chart:    Hello - I just left you a voicemail detailing the plan of care for the bacteria in your urine.  As noted, when this bacteria has recurred, the recommendation is to either use a longer course of the antibiotic previously prescribed or a different antibiotic.  In your case - we used the full length of Macrobid the first time, therefore, we should try a different antibiotic.  There are certain antibiotics I cannot prescribe due to your penicillin allergy.  Therefore I am going to prescribe Bactrim 800mg/160mg twice a day x3 days po.  This is safe at 22 weeks.  We avoid Bactrim in the first trimester, because it can interfere with your baby getting the folic acid from your prenatal vitamins.  We also avoid this medication in the last month of pregnancy due to the fact that it can lead to increased jaundice if given too close to birth.  I have sent the prescription to your pharmacy.  Please call the on-call number at [phone] with questions or concerns, or the clinic number [phone] M-F 8a-5p.     We will retest your urine at your next visit!     Yue Judd CNM    Per Up-To-Date:    \"Management of persistent bacteriuria -- If the first follow-up culture (test of cure) is positive for bacterial growth [?105 cfu/mL] with the same species (persistent bacteriuria), another course of antimicrobial treatment based on susceptibility data should be administered: either the same antimicrobial in a longer course (eg, seven days, if a three-day regimen was used previously) or a different antimicrobial in a standard regimen. True persistent bacteriuria implies " "initial therapy was inadequate and thus requires modification with a different therapeutic approach in contrast to recurrent bacteriuria (a different species is isolated or the same species is isolated after documented clearance of the initial bacteriuria). (See 'Management of recurrent bacteriuria' below.)\"  Since the patient's initial ASB did not resolve with Macobid bid x7 days, and she has a penicillin allergy, will do Bactrim which is safe in second trimester per the following from Up-To-Date:    Pregnancy -   ?In general, TMP-SMX should be avoided in the first trimester of pregnancy since trimethoprim is a folic acid antagonist, and may have an association with folate-sensitive birth defects [50]. However, it should be used if the potential benefit to the mother outweighs the possible risk to the fetus (eg, an HIV-infected pregnant woman who requires prophylaxis/treatment for Pneumocystis jirovecii pneumonia) [51,52]. (See \"Prenatal evaluation of the HIV-infected woman in resource-rich settings\", section on 'Chemoprophylaxis for opportunistic infections'.)  ?TMP-SMX should also be avoided, if possible, in the last month of pregnancy, due to the ability of sulfonamides to displace bilirubin bound to serum albumin, thereby increasing free unconjugated bilirubin levels that could increase the risk of kernicterus in the .  Also regarding recurrent ASB, Up-To-Date recommends the following:  We typically do not recommend antibiotic prophylaxis for recurrent asymptomatic bacteriuria because of the lack of data to support this practice.           "

## 2021-06-27 ENCOUNTER — HEALTH MAINTENANCE LETTER (OUTPATIENT)
Age: 41
End: 2021-06-27

## 2021-07-03 NOTE — ADDENDUM NOTE
Addendum Note by Irene Portillo MD at 4/6/2017 12:36 PM     Author: Irene Portillo MD Service: -- Author Type: Physician    Filed: 4/6/2017 12:36 PM Encounter Date: 3/30/2017 Status: Signed    : Irene Portillo MD (Physician)    Addended by: IRENE PORTILLO on: 4/6/2017 12:36 PM        Modules accepted: Orders

## 2021-07-14 PROBLEM — O26.00 EXCESSIVE WEIGHT GAIN IN PREGNANCY: Status: RESOLVED | Noted: 2018-01-17 | Resolved: 2018-03-21

## 2021-07-14 PROBLEM — Z31.9 INFERTILITY MANAGEMENT: Status: RESOLVED | Noted: 2017-03-30 | Resolved: 2017-12-28

## 2021-07-14 PROBLEM — R82.71 ASYMPTOMATIC BACTERIURIA DURING PREGNANCY IN FIRST TRIMESTER: Status: RESOLVED | Noted: 2017-07-18 | Resolved: 2018-03-21

## 2021-07-14 PROBLEM — O98.311 GENITAL HERPES AFFECTING PREGNANCY IN FIRST TRIMESTER: Status: RESOLVED | Noted: 2017-07-17 | Resolved: 2018-03-21

## 2021-07-14 PROBLEM — O99.891 ASYMPTOMATIC BACTERIURIA DURING PREGNANCY IN FIRST TRIMESTER: Status: RESOLVED | Noted: 2017-07-18 | Resolved: 2018-03-21

## 2021-07-14 PROBLEM — A60.09 GENITAL HERPES AFFECTING PREGNANCY IN FIRST TRIMESTER: Status: RESOLVED | Noted: 2017-07-17 | Resolved: 2018-03-21

## 2021-07-14 PROBLEM — O23.43: Status: RESOLVED | Noted: 2017-12-14 | Resolved: 2018-03-21

## 2021-07-14 PROBLEM — Z67.91 RH NEGATIVE STATE IN ANTEPARTUM PERIOD: Status: RESOLVED | Noted: 2017-10-04 | Resolved: 2018-03-21

## 2021-07-14 PROBLEM — Z34.90 PREGNANT: Status: RESOLVED | Noted: 2018-02-07 | Resolved: 2018-02-08

## 2021-07-14 PROBLEM — O26.899 RH NEGATIVE STATE IN ANTEPARTUM PERIOD: Status: RESOLVED | Noted: 2017-10-04 | Resolved: 2018-03-21

## 2021-07-14 PROBLEM — O23.41: Status: RESOLVED | Noted: 2017-12-14 | Resolved: 2017-12-14

## 2021-07-14 PROBLEM — O09.529 SUPERVISION OF HIGH-RISK PREGNANCY OF ELDERLY MULTIGRAVIDA: Status: RESOLVED | Noted: 2017-07-17 | Resolved: 2018-03-21

## 2021-10-17 ENCOUNTER — HEALTH MAINTENANCE LETTER (OUTPATIENT)
Age: 41
End: 2021-10-17

## 2022-01-26 ENCOUNTER — OFFICE VISIT (OUTPATIENT)
Dept: INTERNAL MEDICINE | Facility: CLINIC | Age: 42
End: 2022-01-26
Payer: COMMERCIAL

## 2022-01-26 VITALS
OXYGEN SATURATION: 100 % | WEIGHT: 142.5 LBS | BODY MASS INDEX: 21.99 KG/M2 | SYSTOLIC BLOOD PRESSURE: 110 MMHG | DIASTOLIC BLOOD PRESSURE: 60 MMHG | HEART RATE: 58 BPM

## 2022-01-26 DIAGNOSIS — Z12.4 SCREENING FOR CERVICAL CANCER: ICD-10-CM

## 2022-01-26 DIAGNOSIS — Z00.00 ANNUAL PHYSICAL EXAM: Primary | ICD-10-CM

## 2022-01-26 DIAGNOSIS — Z12.31 ENCOUNTER FOR SCREENING MAMMOGRAM FOR BREAST CANCER: ICD-10-CM

## 2022-01-26 LAB
ALBUMIN SERPL-MCNC: 4.5 G/DL (ref 3.5–5)
ALBUMIN UR-MCNC: NEGATIVE MG/DL
ALP SERPL-CCNC: 39 U/L (ref 45–120)
ALT SERPL W P-5'-P-CCNC: 14 U/L (ref 0–45)
ANION GAP SERPL CALCULATED.3IONS-SCNC: 7 MMOL/L (ref 5–18)
APPEARANCE UR: CLEAR
AST SERPL W P-5'-P-CCNC: 14 U/L (ref 0–40)
BASOPHILS # BLD AUTO: 0 10E3/UL (ref 0–0.2)
BASOPHILS NFR BLD AUTO: 0 %
BILIRUB SERPL-MCNC: 0.3 MG/DL (ref 0–1)
BILIRUB UR QL STRIP: NEGATIVE
BUN SERPL-MCNC: 12 MG/DL (ref 8–22)
CALCIUM SERPL-MCNC: 9.6 MG/DL (ref 8.5–10.5)
CHLORIDE BLD-SCNC: 107 MMOL/L (ref 98–107)
CHOLEST SERPL-MCNC: 188 MG/DL
CO2 SERPL-SCNC: 26 MMOL/L (ref 22–31)
COLOR UR AUTO: YELLOW
CREAT SERPL-MCNC: 0.82 MG/DL (ref 0.6–1.1)
EOSINOPHIL # BLD AUTO: 0.4 10E3/UL (ref 0–0.7)
EOSINOPHIL NFR BLD AUTO: 6 %
ERYTHROCYTE [DISTWIDTH] IN BLOOD BY AUTOMATED COUNT: 11.8 % (ref 10–15)
FASTING STATUS PATIENT QL REPORTED: NO
GFR SERPL CREATININE-BSD FRML MDRD: >90 ML/MIN/1.73M2
GLUCOSE BLD-MCNC: 87 MG/DL (ref 70–125)
GLUCOSE UR STRIP-MCNC: NEGATIVE MG/DL
HCT VFR BLD AUTO: 43.5 % (ref 35–47)
HDLC SERPL-MCNC: 73 MG/DL
HGB BLD-MCNC: 14.2 G/DL (ref 11.7–15.7)
HGB UR QL STRIP: NEGATIVE
IMM GRANULOCYTES # BLD: 0 10E3/UL
IMM GRANULOCYTES NFR BLD: 0 %
KETONES UR STRIP-MCNC: NEGATIVE MG/DL
LDLC SERPL CALC-MCNC: 93 MG/DL
LEUKOCYTE ESTERASE UR QL STRIP: NEGATIVE
LYMPHOCYTES # BLD AUTO: 2.3 10E3/UL (ref 0.8–5.3)
LYMPHOCYTES NFR BLD AUTO: 32 %
MCH RBC QN AUTO: 29.5 PG (ref 26.5–33)
MCHC RBC AUTO-ENTMCNC: 32.6 G/DL (ref 31.5–36.5)
MCV RBC AUTO: 90 FL (ref 78–100)
MONOCYTES # BLD AUTO: 0.5 10E3/UL (ref 0–1.3)
MONOCYTES NFR BLD AUTO: 7 %
NEUTROPHILS # BLD AUTO: 3.9 10E3/UL (ref 1.6–8.3)
NEUTROPHILS NFR BLD AUTO: 55 %
NITRATE UR QL: NEGATIVE
PH UR STRIP: 6.5 [PH] (ref 5–8)
PLATELET # BLD AUTO: 258 10E3/UL (ref 150–450)
POTASSIUM BLD-SCNC: 4.2 MMOL/L (ref 3.5–5)
PROT SERPL-MCNC: 7.3 G/DL (ref 6–8)
RBC # BLD AUTO: 4.81 10E6/UL (ref 3.8–5.2)
SODIUM SERPL-SCNC: 140 MMOL/L (ref 136–145)
SP GR UR STRIP: 1.02 (ref 1–1.03)
TRIGL SERPL-MCNC: 112 MG/DL
TSH SERPL DL<=0.005 MIU/L-ACNC: 1.43 UIU/ML (ref 0.3–5)
UROBILINOGEN UR STRIP-ACNC: 0.2 E.U./DL
WBC # BLD AUTO: 7.1 10E3/UL (ref 4–11)

## 2022-01-26 PROCEDURE — 80050 GENERAL HEALTH PANEL: CPT | Performed by: NURSE PRACTITIONER

## 2022-01-26 PROCEDURE — 99396 PREV VISIT EST AGE 40-64: CPT | Performed by: NURSE PRACTITIONER

## 2022-01-26 PROCEDURE — 81003 URINALYSIS AUTO W/O SCOPE: CPT | Performed by: NURSE PRACTITIONER

## 2022-01-26 PROCEDURE — G0123 SCREEN CERV/VAG THIN LAYER: HCPCS | Performed by: NURSE PRACTITIONER

## 2022-01-26 PROCEDURE — 36415 COLL VENOUS BLD VENIPUNCTURE: CPT | Performed by: NURSE PRACTITIONER

## 2022-01-26 PROCEDURE — G0124 SCREEN C/V THIN LAYER BY MD: HCPCS | Performed by: PATHOLOGY

## 2022-01-26 PROCEDURE — 80061 LIPID PANEL: CPT | Performed by: NURSE PRACTITIONER

## 2022-01-26 PROCEDURE — 87624 HPV HI-RISK TYP POOLED RSLT: CPT | Performed by: NURSE PRACTITIONER

## 2022-01-26 NOTE — PROGRESS NOTES
SUBJECTIVE:   CC: Cathleen Sterling is an 41 year old woman who presents for preventive health visit.       Patient has been advised of split billing requirements and indicates understanding: Yes  Healthy Habits:     Getting at least 3 servings of Calcium per day:  Yes    Bi-annual eye exam:  NO    Dental care twice a year:  Yes    Sleep apnea or symptoms of sleep apnea:  None    Diet:  Regular (no restrictions)    Frequency of exercise:  2-3 days/week    Duration of exercise:  45-60 minutes    Taking medications regularly:  Not Applicable    Medication side effects:  Not applicable    PHQ-2 Total Score: 0    Additional concerns today:  No              Today's PHQ-2 Score:   PHQ-2 ( 1999 Pfizer) 1/26/2022   Q1: Little interest or pleasure in doing things 0   Q2: Feeling down, depressed or hopeless 0   PHQ-2 Score 0   Q1: Little interest or pleasure in doing things Not at all   Q2: Feeling down, depressed or hopeless Not at all   PHQ-2 Score 0       Abuse: Current or Past (Physical, Sexual or Emotional) - No  Do you feel safe in your environment? Yes    Have you ever done Advance Care Planning? (For example, a Health Directive, POLST, or a discussion with a medical provider or your loved ones about your wishes): No, advance care planning information given to patient to review.  Advanced care planning was discussed at today's visit.    Social History     Tobacco Use     Smoking status: Never Smoker     Smokeless tobacco: Never Used   Substance Use Topics     Alcohol use: No     Comment: Alcoholic Drinks/day: Social     If you drink alcohol do you typically have >3 drinks per day or >7 drinks per week? No    Alcohol Use 1/26/2022   Prescreen: >3 drinks/day or >7 drinks/week? No   Prescreen: >3 drinks/day or >7 drinks/week? -   No flowsheet data found.    Reviewed orders with patient.  Reviewed health maintenance and updated orders accordingly -   BP Readings from Last 3 Encounters:   01/26/22 110/60    Wt Readings  from Last 3 Encounters:   01/26/22 64.6 kg (142 lb 8 oz)   05/25/18 66.7 kg (147 lb)   04/02/18 66.7 kg (147 lb)                  Patient Active Problem List   Diagnosis     Herpes Simplex Type II     Eczema     Human Papilloma Virus Infection     ASCUS with positive high risk HPV cervical     Presence of of 52 mg levonorgestrel-releasing intrauterine device (IUD)     Past Surgical History:   Procedure Laterality Date     COLPOSCOPY  01/2017     WISDOM TOOTH EXTRACTION         Social History     Tobacco Use     Smoking status: Never Smoker     Smokeless tobacco: Never Used   Substance Use Topics     Alcohol use: No     Comment: Alcoholic Drinks/day: Social     Family History   Problem Relation Age of Onset     Diabetes Maternal Grandfather      Heart Disease Maternal Grandfather      Hyperlipidemia Maternal Grandfather      Hypertension Maternal Grandfather      No Known Problems Mother      Alcoholism Father      Depression Father      No Known Problems Brother      Breast Cancer Maternal Aunt         currently getting treatment     Alcoholism Paternal Uncle      Depression Paternal Grandmother          Current Outpatient Medications   Medication Sig Dispense Refill     betamethasone dipropionate (DIPROLENE) 0.05 % ointment [BETAMETHASONE DIPROPIONATE (DIPROLENE) 0.05 % OINTMENT] Apply 1-2 times daily x 2-4 weeks. After achieving remission then apply 2-3 times per week to prevent relapse for 2-3 months. 30 g 0     levonorgestrel (MIRENA) 20 mcg/24 hr (5 years) IUD [LEVONORGESTREL (MIRENA) 20 MCG/24 HR (5 YEARS) IUD] 1 each by Intrauterine route once.       prenatal vitamin iron-folic acid 27mg-0.8mg (PRENATAL S) 27 mg iron- 800 mcg Tab tablet [PRENATAL VITAMIN IRON-FOLIC ACID 27MG-0.8MG (PRENATAL S) 27 MG IRON- 800 MCG TAB TABLET] Take 1 tablet by mouth daily.       Allergies   Allergen Reactions     Penicillins Hives     When patient was young child per pt's mother.       Recent Labs   Lab Test 01/26/22  0450  02/08/18  1530   LDL 93  --    HDL 73  --    TRIG 112  --    ALT 14 18   CR 0.82 0.76   GFRESTIMATED >90 >60   GFRESTBLACK  --  >60   POTASSIUM 4.2  --    TSH 1.43  --         Breast Cancer Screening:    FHS-7:   Breast CA Risk Assessment (FHS-7) 1/26/2022   Did any of your first-degree relatives have breast or ovarian cancer? No   Did any of your relatives have bilateral breast cancer? Unknown   Did any man in your family have breast cancer? No   Did any woman in your family have breast and ovarian cancer? Unknown   Did any woman in your family have breast cancer before age 50 y? Unknown   Do you have 2 or more relatives with breast and/or ovarian cancer? No   Do you have 2 or more relatives with breast and/or bowel cancer? No       Mammogram Screening - Offered annual screening and updated Health Maintenance for mutual plan based on risk factor consideration    Pertinent mammograms are reviewed under the imaging tab.    History of abnormal Pap smear:   Last 3 Pap and HPV Results:   PAP / HPV Latest Ref Rng & Units 1/26/2022 3/21/2018 12/8/2016   PAP   Atypical squamous cells of undetermined significance (ASC-US)(A) Negative for squamous intraepithelial lesion or malignancy  Electronically signed by Sherrell Edge CT (ASCP) on 3/28/2018 at 12:00 PM   Atypical squamous cells of undetermined significance  Electronically signed by Stephan Hancock MD PhD on 12/15/2016 at  8:41 AM     HPV16 Negative Negative Negative -   HPV18 Negative Negative Negative -   HRHPV Negative Negative Negative -     PAP / HPV Latest Ref Rng & Units 3/21/2018 12/8/2016 11/19/2015   PAP - Negative for squamous intraepithelial lesion or malignancy  Electronically signed by Sherrell Edge CT (ASCP) on 3/28/2018 at 12:00 PM   Atypical squamous cells of undetermined significance  Electronically signed by Stephan Hancock MD PhD on 12/15/2016 at  8:41 AM   Negative for squamous intraepithelial lesion or malignancy  Electronically signed by  Niyah Greenwood, CT (ASCP) on 2015 at  1:43 PM     HPV16 NEG Negative - -   HPV18 NEG Negative - -   HRHPV NEG Negative - -     Reviewed and updated as needed this visit by clinical staff  Tobacco  Allergies  Meds             Reviewed and updated as needed this visit by Provider               Past Medical History:   Diagnosis Date     ASCUS with positive high risk HPV cervical 12/10/2015    5/26/10 NIL 11 ASCUS, +HR HPV (not 16/18) 11 Colpo bx KYMBERLY I, ECC acellular 10/5/12 LSIL 10/24/12 Colpo bx KYMBERLY I 13 NIL pap, +HR HPV (not 16/18) 1/3/14 Colpo bx KYMBERLY I, ECC neg, ASCUS pap 14 NIL pap, +HR HPV (not 16/18) 11/19/15 NIL pap, +HR HPV (not 16/18) 12/10/15 Colpo visually normal, no bx 16 ASCUS, +HR HPV (not 16/18) 17 Colpo visually normal, no bx 3/21/18 NIL       Past Surgical History:   Procedure Laterality Date     COLPOSCOPY  2017     WISDOM TOOTH EXTRACTION       OB History    Para Term  AB Living   4 2 2 0 2 2   SAB IAB Ectopic Multiple Live Births   1 1 0 0 2      # Outcome Date GA Lbr Huseyin/2nd Weight Sex Delivery Anes PTL Lv   4 Term 18 39w6d 06:35 3.317 kg (7 lb 5 oz) F Vag-Spont None N KAELYN      Birth Comments: SROM @ 0600, ctx started at 1000[place] [facility] [place], complete at 1235 and birthed rapidly.  No repair      Name: Alycia      Apgar1: 8  Apgar5: 9   3 SAB 2016 5w0d             Birth Comments: No D&C   2 IAB               Birth Comments: D&C   1 Term 05 38w0d  3.487 kg (7 lb 11 oz) M Vag-Spont IV N KAELYN      Birth Comments: 8 cm on arrival (back labor), GBS positive, no tear - no stitches, BF x1 year      Name: Pepe      Apgar1: 9  Apgar5: 10       Review of Systems  CONSTITUTIONAL: NEGATIVE for fever, chills, change in weight  INTEGUMENTARU/SKIN: NEGATIVE for worrisome rashes, moles or lesions  EYES: NEGATIVE for vision changes or irritation  ENT: NEGATIVE for ear, mouth and throat problems  RESP: NEGATIVE for  significant cough or SOB  BREAST: NEGATIVE for masses, tenderness or discharge  CV: NEGATIVE for chest pain, palpitations or peripheral edema  GI: NEGATIVE for nausea, abdominal pain, heartburn, or change in bowel habits  : NEGATIVE for unusual urinary or vaginal symptoms. Periods are regular.  MUSCULOSKELETAL: NEGATIVE for significant arthralgias or myalgia  NEURO: NEGATIVE for weakness, dizziness or paresthesias  PSYCHIATRIC: NEGATIVE for changes in mood or affect     OBJECTIVE:   /60 (BP Location: Right arm, Patient Position: Sitting, Cuff Size: Adult Regular)   Pulse 58   Wt 64.6 kg (142 lb 8 oz)   SpO2 100%   BMI 21.99 kg/m    Physical Exam  GENERAL: healthy, alert and no distress  EYES: Eyes grossly normal to inspection, PERRL and conjunctivae and sclerae normal  HENT: ear canals and TM's normal, nose and mouth without ulcers or lesions  NECK: no adenopathy  RESP: lungs clear to auscultation - no rales, rhonchi or wheezes  BREAST: normal without masses, tenderness or nipple discharge and no palpable axillary masses or adenopathy  CV: regular rate and rhythm, normal S1 S2, , no peripheral edema and peripheral pulses strong  ABDOMEN: soft, nontender, no hepatosplenomegaly, no masses and bowel sounds normal   (female): normal female external genitalia, normal urethral meatus, vaginal mucosa pink, moist, well rugated, and normal cervix/adnexa/uterus without masses or discharge, pap obtained   MS: no gross musculoskeletal defects noted, no edema  SKIN: no suspicious lesions or rashes  NEURO: Normal strength and tone, mentation intact and speech normal  PSYCH: mentation appears normal, affect normal/bright        ASSESSMENT/PLAN:     Problem List Items Addressed This Visit    None     Visit Diagnoses     Annual physical exam    -  Primary    Relevant Orders    Lipid Profile (Chol, Trig, HDL, LDL calc) (Completed)    Comprehensive metabolic panel (Completed)    CBC with platelets and differential  "(Completed)    UA Macro with Reflex to Micro and Culture - lab collect (Completed)    TSH with free T4 reflex (Completed)    Encounter for screening mammogram for breast cancer        Relevant Orders    *MA Screening Digital Bilateral    Screening for cervical cancer        Relevant Orders    Pap screen with HPV - recommended age 30 - 65 years (Completed)    HPV High Risk Types DNA Cervical (Completed)              COUNSELING:      Estimated body mass index is 21.99 kg/m  as calculated from the following:    Height as of 5/25/18: 1.715 m (5' 7.5\").    Weight as of this encounter: 64.6 kg (142 lb 8 oz).        She reports that she has never smoked. She has never used smokeless tobacco.      Counseling Resources:  ATP IV Guidelines  Pooled Cohorts Equation Calculator  Breast Cancer Risk Calculator  BRCA-Related Cancer Risk Assessment: FHS-7 Tool  FRAX Risk Assessment  ICSI Preventive Guidelines  Dietary Guidelines for Americans, 2010  USDA's MyPlate  ASA Prophylaxis  Lung CA Screening    Keely Alvarado NP  [facility]  "

## 2022-01-31 LAB
HUMAN PAPILLOMA VIRUS 16 DNA: NEGATIVE
HUMAN PAPILLOMA VIRUS 18 DNA: NEGATIVE
HUMAN PAPILLOMA VIRUS FINAL DIAGNOSIS: NORMAL
HUMAN PAPILLOMA VIRUS OTHER HR: NEGATIVE

## 2022-02-03 LAB
BKR LAB AP GYN ADEQUACY: ABNORMAL
BKR LAB AP GYN INTERPRETATION: ABNORMAL
BKR LAB AP HPV REFLEX: ABNORMAL
BKR LAB AP PREVIOUS ABNORMAL: ABNORMAL
PATH REPORT.COMMENTS IMP SPEC: ABNORMAL
PATH REPORT.COMMENTS IMP SPEC: ABNORMAL
PATH REPORT.RELEVANT HX SPEC: ABNORMAL

## 2022-09-14 ENCOUNTER — LAB REQUISITION (OUTPATIENT)
Dept: LAB | Facility: CLINIC | Age: 42
End: 2022-09-14

## 2022-09-14 LAB
VZV IGG SER QL IA: 1605 INDEX
VZV IGG SER QL IA: POSITIVE

## 2022-09-14 PROCEDURE — 36415 COLL VENOUS BLD VENIPUNCTURE: CPT

## 2022-09-14 PROCEDURE — 86787 VARICELLA-ZOSTER ANTIBODY: CPT

## 2022-10-02 ENCOUNTER — HEALTH MAINTENANCE LETTER (OUTPATIENT)
Age: 42
End: 2022-10-02

## 2022-10-24 ENCOUNTER — ANCILLARY PROCEDURE (OUTPATIENT)
Dept: MAMMOGRAPHY | Facility: HOSPITAL | Age: 42
End: 2022-10-24
Attending: NURSE PRACTITIONER
Payer: COMMERCIAL

## 2022-10-24 DIAGNOSIS — Z12.31 ENCOUNTER FOR SCREENING MAMMOGRAM FOR BREAST CANCER: ICD-10-CM

## 2022-10-24 PROCEDURE — 77067 SCR MAMMO BI INCL CAD: CPT

## 2023-05-20 ENCOUNTER — HEALTH MAINTENANCE LETTER (OUTPATIENT)
Age: 43
End: 2023-05-20

## 2024-07-27 ENCOUNTER — HEALTH MAINTENANCE LETTER (OUTPATIENT)
Age: 44
End: 2024-07-27

## 2024-10-22 ENCOUNTER — ANCILLARY PROCEDURE (OUTPATIENT)
Dept: MAMMOGRAPHY | Facility: CLINIC | Age: 44
End: 2024-10-22
Attending: FAMILY MEDICINE
Payer: COMMERCIAL

## 2024-10-22 DIAGNOSIS — Z12.31 VISIT FOR SCREENING MAMMOGRAM: ICD-10-CM

## 2024-10-22 PROCEDURE — 77063 BREAST TOMOSYNTHESIS BI: CPT

## 2025-08-10 ENCOUNTER — HEALTH MAINTENANCE LETTER (OUTPATIENT)
Age: 45
End: 2025-08-10